# Patient Record
Sex: FEMALE | Race: BLACK OR AFRICAN AMERICAN
[De-identification: names, ages, dates, MRNs, and addresses within clinical notes are randomized per-mention and may not be internally consistent; named-entity substitution may affect disease eponyms.]

---

## 2017-08-16 ENCOUNTER — HOSPITAL ENCOUNTER (EMERGENCY)
Dept: HOSPITAL 15 - ER | Age: 27
Discharge: LEFT BEFORE DISCHARGE | End: 2017-08-16
Payer: MEDICAID

## 2017-08-16 VITALS — SYSTOLIC BLOOD PRESSURE: 107 MMHG | DIASTOLIC BLOOD PRESSURE: 61 MMHG

## 2017-08-16 VITALS — WEIGHT: 268 LBS | BODY MASS INDEX: 40.62 KG/M2 | HEIGHT: 68 IN

## 2017-08-16 DIAGNOSIS — Z53.21: ICD-10-CM

## 2017-08-16 DIAGNOSIS — R07.9: Primary | ICD-10-CM

## 2017-08-16 LAB
ALBUMIN SERPL-MCNC: 3.7 G/DL (ref 3.4–5)
ALP SERPL-CCNC: 75 U/L (ref 45–117)
ANION GAP SERPL CALCULATED.3IONS-SCNC: 8 MMOL/L (ref 5–15)
BILIRUB SERPL-MCNC: 0.3 MG/DL (ref 0.2–1)
BUN SERPL-MCNC: 7 MG/DL (ref 7–18)
BUN/CREAT SERPL: 8.5
CALCIUM SERPL-MCNC: 9.4 MG/DL (ref 8.5–10.1)
CHLORIDE SERPL-SCNC: 109 MMOL/L (ref 98–107)
CO2 SERPL-SCNC: 23 MMOL/L (ref 21–32)
GLUCOSE SERPL-MCNC: 100 MG/DL (ref 74–106)
HCT VFR BLD AUTO: 41.4 % (ref 36–46)
HGB BLD-MCNC: 13.6 G/DL (ref 12.2–16.2)
MAGNESIUM SERPL-MCNC: 2.5 MG/DL (ref 1.6–2.6)
MCH RBC QN AUTO: 28.1 PG (ref 28–32)
MCV RBC AUTO: 85.4 FL (ref 80–100)
NRBC BLD QL AUTO: 0 %
POTASSIUM SERPL-SCNC: 3.8 MMOL/L (ref 3.5–5.1)
PROT SERPL-MCNC: 7.6 G/DL (ref 6.4–8.2)
SODIUM SERPL-SCNC: 140 MMOL/L (ref 136–145)

## 2017-08-16 PROCEDURE — 83735 ASSAY OF MAGNESIUM: CPT

## 2017-08-16 PROCEDURE — 36415 COLL VENOUS BLD VENIPUNCTURE: CPT

## 2017-08-16 PROCEDURE — 85025 COMPLETE CBC W/AUTO DIFF WBC: CPT

## 2017-08-16 PROCEDURE — 80053 COMPREHEN METABOLIC PANEL: CPT

## 2017-08-16 PROCEDURE — 84484 ASSAY OF TROPONIN QUANT: CPT

## 2021-06-09 ENCOUNTER — HOSPITAL ENCOUNTER (EMERGENCY)
Dept: HOSPITAL 15 - ER | Age: 31
Discharge: HOME | End: 2021-06-09
Payer: MEDICAID

## 2021-06-09 VITALS — DIASTOLIC BLOOD PRESSURE: 53 MMHG | SYSTOLIC BLOOD PRESSURE: 120 MMHG

## 2021-06-09 VITALS — BODY MASS INDEX: 44.41 KG/M2 | WEIGHT: 293 LBS | HEIGHT: 68 IN

## 2021-06-09 DIAGNOSIS — Z79.899: ICD-10-CM

## 2021-06-09 DIAGNOSIS — I10: ICD-10-CM

## 2021-06-09 DIAGNOSIS — E78.5: ICD-10-CM

## 2021-06-09 DIAGNOSIS — S33.5XXA: Primary | ICD-10-CM

## 2021-06-09 DIAGNOSIS — Z90.49: ICD-10-CM
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
## 2021-06-09 DIAGNOSIS — R07.89: ICD-10-CM
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
## 2021-06-09 DIAGNOSIS — E66.01: ICD-10-CM

## 2021-06-09 LAB
ALBUMIN SERPL-MCNC: 3.5 G/DL (ref 3.4–5)
ALCOHOL, URINE: < 3 MG/DL (ref 0–10)
ALP SERPL-CCNC: 65 U/L (ref 45–117)
ALT SERPL-CCNC: 64 U/L (ref 13–56)
AMPHETAMINES UR QL SCN: NEGATIVE
ANION GAP SERPL CALCULATED.3IONS-SCNC: 5 MMOL/L (ref 5–15)
BARBITURATES UR QL SCN: NEGATIVE
BENZODIAZ UR QL SCN: POSITIVE
BILIRUB SERPL-MCNC: 0.3 MG/DL (ref 0.2–1)
BUN SERPL-MCNC: 8 MG/DL (ref 7–18)
BUN/CREAT SERPL: 11.6
BZE UR QL SCN: NEGATIVE
CALCIUM SERPL-MCNC: 8.8 MG/DL (ref 8.5–10.1)
CANNABINOIDS UR QL SCN: NEGATIVE
CHLORIDE SERPL-SCNC: 107 MMOL/L (ref 98–107)
CO2 SERPL-SCNC: 27 MMOL/L (ref 21–32)
GLUCOSE SERPL-MCNC: 91 MG/DL (ref 74–106)
HCT VFR BLD AUTO: 40.9 % (ref 36–46)
HGB BLD-MCNC: 13.6 G/DL (ref 12.2–16.2)
MCH RBC QN AUTO: 29 PG (ref 28–32)
MCV RBC AUTO: 87.3 FL (ref 80–100)
NRBC BLD QL AUTO: 0.1 %
OPIATES UR QL SCN: NEGATIVE
PCP UR QL SCN: NEGATIVE
POTASSIUM SERPL-SCNC: 4.2 MMOL/L (ref 3.5–5.1)
PROT SERPL-MCNC: 7.6 G/DL (ref 6.4–8.2)
SODIUM SERPL-SCNC: 139 MMOL/L (ref 136–145)

## 2021-06-09 PROCEDURE — 85025 COMPLETE CBC W/AUTO DIFF WBC: CPT

## 2021-06-09 PROCEDURE — 71045 X-RAY EXAM CHEST 1 VIEW: CPT

## 2021-06-09 PROCEDURE — 96374 THER/PROPH/DIAG INJ IV PUSH: CPT

## 2021-06-09 PROCEDURE — 81025 URINE PREGNANCY TEST: CPT

## 2021-06-09 PROCEDURE — 36415 COLL VENOUS BLD VENIPUNCTURE: CPT

## 2021-06-09 PROCEDURE — 84484 ASSAY OF TROPONIN QUANT: CPT

## 2021-06-09 PROCEDURE — 81001 URINALYSIS AUTO W/SCOPE: CPT

## 2021-06-09 PROCEDURE — 80307 DRUG TEST PRSMV CHEM ANLYZR: CPT

## 2021-06-09 PROCEDURE — 93005 ELECTROCARDIOGRAM TRACING: CPT

## 2021-06-09 PROCEDURE — 80053 COMPREHEN METABOLIC PANEL: CPT

## 2021-06-09 PROCEDURE — 99285 EMERGENCY DEPT VISIT HI MDM: CPT

## 2021-07-15 ENCOUNTER — HOSPITAL ENCOUNTER (EMERGENCY)
Dept: HOSPITAL 15 - ER | Age: 31
Discharge: HOME | End: 2021-07-15
Payer: MEDICAID

## 2021-07-15 VITALS — HEIGHT: 68 IN | WEIGHT: 293 LBS | BODY MASS INDEX: 44.41 KG/M2

## 2021-07-15 VITALS — DIASTOLIC BLOOD PRESSURE: 61 MMHG | SYSTOLIC BLOOD PRESSURE: 118 MMHG

## 2021-07-15 DIAGNOSIS — Z79.899: ICD-10-CM

## 2021-07-15 DIAGNOSIS — Z90.49: ICD-10-CM

## 2021-07-15 DIAGNOSIS — R07.89: Primary | ICD-10-CM

## 2021-07-15 DIAGNOSIS — I10: ICD-10-CM

## 2021-07-15 LAB
ALBUMIN SERPL-MCNC: 3.7 G/DL (ref 3.4–5)
ALP SERPL-CCNC: 67 U/L (ref 45–117)
ALT SERPL-CCNC: 47 U/L (ref 13–56)
ANION GAP SERPL CALCULATED.3IONS-SCNC: 4 MMOL/L (ref 5–15)
BILIRUB SERPL-MCNC: 0.3 MG/DL (ref 0.2–1)
BUN SERPL-MCNC: 7 MG/DL (ref 7–18)
BUN/CREAT SERPL: 8.8
CALCIUM SERPL-MCNC: 9.1 MG/DL (ref 8.5–10.1)
CHLORIDE SERPL-SCNC: 108 MMOL/L (ref 98–107)
CO2 SERPL-SCNC: 27 MMOL/L (ref 21–32)
CRYSTALS UR QL AUTO: (no result) /HPF
GLUCOSE SERPL-MCNC: 100 MG/DL (ref 74–106)
HCT VFR BLD AUTO: 41.5 % (ref 36–46)
HGB BLD-MCNC: 13.8 G/DL (ref 12.2–16.2)
MCH RBC QN AUTO: 28.7 PG (ref 28–32)
MCV RBC AUTO: 86.6 FL (ref 80–100)
NRBC BLD QL AUTO: 0.1 %
POTASSIUM SERPL-SCNC: 4.4 MMOL/L (ref 3.5–5.1)
PROT SERPL-MCNC: 8.1 G/DL (ref 6.4–8.2)
SODIUM SERPL-SCNC: 139 MMOL/L (ref 136–145)

## 2021-07-15 PROCEDURE — 99285 EMERGENCY DEPT VISIT HI MDM: CPT

## 2021-07-15 PROCEDURE — 81001 URINALYSIS AUTO W/SCOPE: CPT

## 2021-07-15 PROCEDURE — 96372 THER/PROPH/DIAG INJ SC/IM: CPT

## 2021-07-15 PROCEDURE — 84484 ASSAY OF TROPONIN QUANT: CPT

## 2021-07-15 PROCEDURE — 36415 COLL VENOUS BLD VENIPUNCTURE: CPT

## 2021-07-15 PROCEDURE — 85025 COMPLETE CBC W/AUTO DIFF WBC: CPT

## 2021-07-15 PROCEDURE — 80053 COMPREHEN METABOLIC PANEL: CPT

## 2021-08-14 ENCOUNTER — HOSPITAL ENCOUNTER (EMERGENCY)
Dept: HOSPITAL 15 - ER | Age: 31
Discharge: HOME | End: 2021-08-14
Payer: MEDICAID

## 2021-08-14 VITALS — WEIGHT: 290 LBS | BODY MASS INDEX: 43.95 KG/M2 | HEIGHT: 68 IN

## 2021-08-14 VITALS — SYSTOLIC BLOOD PRESSURE: 127 MMHG | DIASTOLIC BLOOD PRESSURE: 82 MMHG

## 2021-08-14 DIAGNOSIS — Z79.899: ICD-10-CM

## 2021-08-14 DIAGNOSIS — Z90.49: ICD-10-CM

## 2021-08-14 DIAGNOSIS — K29.70: Primary | ICD-10-CM

## 2021-08-14 DIAGNOSIS — I10: ICD-10-CM

## 2021-08-14 LAB
ALBUMIN SERPL-MCNC: 3.3 G/DL (ref 3.4–5)
ALP SERPL-CCNC: 69 U/L (ref 45–117)
ALT SERPL-CCNC: 62 U/L (ref 13–56)
ANION GAP SERPL CALCULATED.3IONS-SCNC: 4 MMOL/L (ref 5–15)
BILIRUB SERPL-MCNC: 0.3 MG/DL (ref 0.2–1)
BUN SERPL-MCNC: 11 MG/DL (ref 7–18)
BUN/CREAT SERPL: 14.1
CALCIUM SERPL-MCNC: 8.8 MG/DL (ref 8.5–10.1)
CHLORIDE SERPL-SCNC: 108 MMOL/L (ref 98–107)
CO2 SERPL-SCNC: 26 MMOL/L (ref 21–32)
GLUCOSE SERPL-MCNC: 98 MG/DL (ref 74–106)
HCT VFR BLD AUTO: 39.4 % (ref 36–46)
HGB BLD-MCNC: 13.2 G/DL (ref 12.2–16.2)
MAGNESIUM SERPL-MCNC: 2.5 MG/DL (ref 1.6–2.6)
MCH RBC QN AUTO: 28.9 PG (ref 28–32)
MCV RBC AUTO: 86.4 FL (ref 80–100)
NRBC BLD QL AUTO: 0 %
POTASSIUM SERPL-SCNC: 3.8 MMOL/L (ref 3.5–5.1)
PROT SERPL-MCNC: 7.4 G/DL (ref 6.4–8.2)
SODIUM SERPL-SCNC: 138 MMOL/L (ref 136–145)

## 2021-08-14 PROCEDURE — 84484 ASSAY OF TROPONIN QUANT: CPT

## 2021-08-14 PROCEDURE — 76705 ECHO EXAM OF ABDOMEN: CPT

## 2021-08-14 PROCEDURE — 74176 CT ABD & PELVIS W/O CONTRAST: CPT

## 2021-08-14 PROCEDURE — 80053 COMPREHEN METABOLIC PANEL: CPT

## 2021-08-14 PROCEDURE — 83735 ASSAY OF MAGNESIUM: CPT

## 2021-08-14 PROCEDURE — 93005 ELECTROCARDIOGRAM TRACING: CPT

## 2021-08-14 PROCEDURE — 85025 COMPLETE CBC W/AUTO DIFF WBC: CPT

## 2021-08-14 PROCEDURE — 36415 COLL VENOUS BLD VENIPUNCTURE: CPT

## 2021-08-14 PROCEDURE — 81001 URINALYSIS AUTO W/SCOPE: CPT

## 2021-08-14 PROCEDURE — 81025 URINE PREGNANCY TEST: CPT

## 2021-08-14 PROCEDURE — 83690 ASSAY OF LIPASE: CPT

## 2021-09-14 ENCOUNTER — HOSPITAL ENCOUNTER (EMERGENCY)
Dept: HOSPITAL 15 - ER | Age: 31
Discharge: HOME | End: 2021-09-14
Payer: MEDICAID

## 2021-09-14 VITALS — WEIGHT: 290 LBS | HEIGHT: 68 IN | BODY MASS INDEX: 43.95 KG/M2

## 2021-09-14 VITALS — DIASTOLIC BLOOD PRESSURE: 67 MMHG | SYSTOLIC BLOOD PRESSURE: 123 MMHG

## 2021-09-14 DIAGNOSIS — R07.89: ICD-10-CM

## 2021-09-14 DIAGNOSIS — N39.0: ICD-10-CM

## 2021-09-14 DIAGNOSIS — Z79.899: ICD-10-CM

## 2021-09-14 DIAGNOSIS — I10: Primary | ICD-10-CM

## 2021-09-14 DIAGNOSIS — Z90.49: ICD-10-CM

## 2021-09-14 LAB
ALBUMIN SERPL-MCNC: 3.4 G/DL (ref 3.4–5)
ALP SERPL-CCNC: 77 U/L (ref 45–117)
ALT SERPL-CCNC: 57 U/L (ref 13–56)
ANION GAP SERPL CALCULATED.3IONS-SCNC: 8 MMOL/L (ref 5–15)
BILIRUB SERPL-MCNC: 0.4 MG/DL (ref 0.2–1)
BUN SERPL-MCNC: 8 MG/DL (ref 7–18)
BUN/CREAT SERPL: 8.9
CALCIUM SERPL-MCNC: 8.6 MG/DL (ref 8.5–10.1)
CHLORIDE SERPL-SCNC: 108 MMOL/L (ref 98–107)
CO2 SERPL-SCNC: 23 MMOL/L (ref 21–32)
GLUCOSE SERPL-MCNC: 120 MG/DL (ref 74–106)
HCT VFR BLD AUTO: 43.8 % (ref 36–46)
HGB BLD-MCNC: 14.4 G/DL (ref 12.2–16.2)
MCH RBC QN AUTO: 28.7 PG (ref 28–32)
MCV RBC AUTO: 87 FL (ref 80–100)
NRBC BLD QL AUTO: 0.1 %
POTASSIUM SERPL-SCNC: 3.7 MMOL/L (ref 3.5–5.1)
PROT SERPL-MCNC: 7.6 G/DL (ref 6.4–8.2)
SODIUM SERPL-SCNC: 139 MMOL/L (ref 136–145)

## 2021-09-14 PROCEDURE — 84484 ASSAY OF TROPONIN QUANT: CPT

## 2021-09-14 PROCEDURE — 36415 COLL VENOUS BLD VENIPUNCTURE: CPT

## 2021-09-14 PROCEDURE — 99285 EMERGENCY DEPT VISIT HI MDM: CPT

## 2021-09-14 PROCEDURE — 81001 URINALYSIS AUTO W/SCOPE: CPT

## 2021-09-14 PROCEDURE — 96372 THER/PROPH/DIAG INJ SC/IM: CPT

## 2021-09-14 PROCEDURE — 85025 COMPLETE CBC W/AUTO DIFF WBC: CPT

## 2021-09-14 PROCEDURE — 80053 COMPREHEN METABOLIC PANEL: CPT

## 2021-09-14 PROCEDURE — 83880 ASSAY OF NATRIURETIC PEPTIDE: CPT

## 2021-09-14 PROCEDURE — 93005 ELECTROCARDIOGRAM TRACING: CPT

## 2021-09-14 PROCEDURE — 81025 URINE PREGNANCY TEST: CPT

## 2021-09-14 PROCEDURE — 71045 X-RAY EXAM CHEST 1 VIEW: CPT

## 2021-09-14 PROCEDURE — 84443 ASSAY THYROID STIM HORMONE: CPT

## 2021-10-30 ENCOUNTER — HOSPITAL ENCOUNTER (EMERGENCY)
Dept: HOSPITAL 15 - ER | Age: 31
Discharge: HOME | End: 2021-10-30
Payer: MEDICAID

## 2021-10-30 VITALS — DIASTOLIC BLOOD PRESSURE: 74 MMHG | SYSTOLIC BLOOD PRESSURE: 128 MMHG

## 2021-10-30 VITALS — BODY MASS INDEX: 44.41 KG/M2 | HEIGHT: 68 IN | WEIGHT: 293 LBS
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
## 2021-10-30 DIAGNOSIS — F12.10: ICD-10-CM

## 2021-10-30 DIAGNOSIS — Z90.49: ICD-10-CM
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
## 2021-10-30 DIAGNOSIS — I10: ICD-10-CM

## 2021-10-30 DIAGNOSIS — S29.012A: Primary | ICD-10-CM

## 2021-10-30 PROCEDURE — 81002 URINALYSIS NONAUTO W/O SCOPE: CPT

## 2021-10-30 PROCEDURE — 96372 THER/PROPH/DIAG INJ SC/IM: CPT

## 2021-10-30 PROCEDURE — 71046 X-RAY EXAM CHEST 2 VIEWS: CPT

## 2021-10-30 PROCEDURE — 99283 EMERGENCY DEPT VISIT LOW MDM: CPT

## 2021-10-30 PROCEDURE — 81025 URINE PREGNANCY TEST: CPT

## 2021-11-13 ENCOUNTER — HOSPITAL ENCOUNTER (EMERGENCY)
Dept: HOSPITAL 15 - ER | Age: 31
Discharge: LEFT BEFORE DISCHARGE | End: 2021-11-13
Payer: MEDICAID

## 2021-11-13 VITALS — HEIGHT: 68 IN | BODY MASS INDEX: 44.41 KG/M2 | WEIGHT: 293 LBS

## 2021-11-13 VITALS — DIASTOLIC BLOOD PRESSURE: 60 MMHG | SYSTOLIC BLOOD PRESSURE: 100 MMHG

## 2021-11-13 DIAGNOSIS — Z53.21: ICD-10-CM

## 2021-11-13 DIAGNOSIS — R07.9: Primary | ICD-10-CM

## 2021-11-13 LAB
ALBUMIN SERPL-MCNC: 3.4 G/DL (ref 3.4–5)
ALP SERPL-CCNC: 69 U/L (ref 45–117)
ALT SERPL-CCNC: 44 U/L (ref 13–56)
ANION GAP SERPL CALCULATED.3IONS-SCNC: 5 MMOL/L (ref 5–15)
BILIRUB SERPL-MCNC: 0.3 MG/DL (ref 0.2–1)
BUN SERPL-MCNC: 10 MG/DL (ref 7–18)
BUN/CREAT SERPL: 10.6
CALCIUM SERPL-MCNC: 8.5 MG/DL (ref 8.5–10.1)
CHLORIDE SERPL-SCNC: 107 MMOL/L (ref 98–107)
CO2 SERPL-SCNC: 25 MMOL/L (ref 21–32)
GLUCOSE SERPL-MCNC: 88 MG/DL (ref 74–106)
HCT VFR BLD AUTO: 42 % (ref 36–46)
HGB BLD-MCNC: 13.7 G/DL (ref 12.2–16.2)
MCH RBC QN AUTO: 28.3 PG (ref 28–32)
MCV RBC AUTO: 86.5 FL (ref 80–100)
NRBC BLD QL AUTO: 0 %
POTASSIUM SERPL-SCNC: 3.8 MMOL/L (ref 3.5–5.1)
PROT SERPL-MCNC: 7.4 G/DL (ref 6.4–8.2)
SODIUM SERPL-SCNC: 137 MMOL/L (ref 136–145)

## 2021-11-13 PROCEDURE — 85025 COMPLETE CBC W/AUTO DIFF WBC: CPT

## 2021-11-13 PROCEDURE — 93005 ELECTROCARDIOGRAM TRACING: CPT

## 2021-11-13 PROCEDURE — 80053 COMPREHEN METABOLIC PANEL: CPT

## 2021-11-13 PROCEDURE — 84484 ASSAY OF TROPONIN QUANT: CPT

## 2021-11-13 PROCEDURE — 36415 COLL VENOUS BLD VENIPUNCTURE: CPT

## 2025-01-07 ENCOUNTER — HOSPITAL ENCOUNTER (OUTPATIENT)
Dept: HOSPITAL 15 - RT | Age: 35
Discharge: HOME | End: 2025-01-07
Attending: INTERNAL MEDICINE
Payer: COMMERCIAL

## 2025-01-07 DIAGNOSIS — R06.09: Primary | ICD-10-CM

## 2025-01-07 PROCEDURE — 94060 EVALUATION OF WHEEZING: CPT

## 2025-01-07 PROCEDURE — 94727 GAS DIL/WSHOT DETER LNG VOL: CPT

## 2025-01-07 PROCEDURE — 94729 DIFFUSING CAPACITY: CPT

## 2025-01-13 NOTE — DVHNC2
Procedure


-


Pulmonary function test interpretation 


January 7, 2025 


No obstructive or restrictive ventilatory defect.  


No significant bronchodilator response.  


Total lung capacity is within normal limits, 96% of predicted (5.68 L).  


Diffusion capacity is within normal limits, 107% of predicted.











ANGELA ORTEGA MD             Jan 13, 2025 21:54

## 2025-03-22 ENCOUNTER — HOSPITAL ENCOUNTER (EMERGENCY)
Dept: HOSPITAL 15 - ER | Age: 35
Discharge: HOME | End: 2025-03-22
Payer: COMMERCIAL

## 2025-03-22 VITALS
DIASTOLIC BLOOD PRESSURE: 81 MMHG | TEMPERATURE: 98.3 F | SYSTOLIC BLOOD PRESSURE: 156 MMHG | HEART RATE: 117 BPM | OXYGEN SATURATION: 97 % | RESPIRATION RATE: 18 BRPM

## 2025-03-22 VITALS — BODY MASS INDEX: 44.41 KG/M2 | HEIGHT: 68 IN | WEIGHT: 293 LBS

## 2025-03-22 DIAGNOSIS — A59.01: ICD-10-CM

## 2025-03-22 DIAGNOSIS — O23.42: ICD-10-CM

## 2025-03-22 DIAGNOSIS — N39.0: ICD-10-CM

## 2025-03-22 DIAGNOSIS — Z3A.18: ICD-10-CM

## 2025-03-22 DIAGNOSIS — O23.592: Primary | ICD-10-CM

## 2025-03-22 LAB
CRYSTALS UR QL AUTO: (no result) /HPF
PROT UR STRIP.AUTO-MCNC: (no result) MG/DL

## 2025-03-22 PROCEDURE — 87210 SMEAR WET MOUNT SALINE/INK: CPT

## 2025-03-22 PROCEDURE — 99283 EMERGENCY DEPT VISIT LOW MDM: CPT

## 2025-03-22 PROCEDURE — 96372 THER/PROPH/DIAG INJ SC/IM: CPT

## 2025-03-22 PROCEDURE — 81001 URINALYSIS AUTO W/SCOPE: CPT

## 2025-03-22 PROCEDURE — 81025 URINE PREGNANCY TEST: CPT

## 2025-03-22 RX ADMIN — ONDANSETRON ONE MG: 4 TABLET, ORALLY DISINTEGRATING ORAL at 12:47

## 2025-03-22 RX ADMIN — DEXTROSE ONE MG: 50 INJECTION, SOLUTION INTRAVENOUS at 12:42

## 2025-03-22 NOTE — ED.PDOC
OB/GYN


HPI Comments


A 34 YEAR OLD FEMALE PRESENTS TO THE ED WITH COMPLAINT OF VAGINAL DISCHARGE.  

PATIENT STATES SHE HAS BEEN EXPERIENCING VAGINAL DISCHARGE THAT IS WHITE IN 

COLOR WITH PAINFUL URINATION FOR THE PAST 1 WEEK.  PATIENT NOTES SHE IS 

CURRENTLY 18 WEEKS PREGNANT. PT DENIES PREGNANCY PROBLEMS. PATIENT DENIES 

HEMATURIA, FLANK PAIN, VAGINAL SPOTTING/BLEEDING, FEVER, CHILLS, SHORTNESS OF 

BREATH, CHEST PAIN, ABDOMINAL PAIN, NAUSEA, VOMITING, HEADACHE, OR OTHER 

COMPLAINTS. NO OTHER SYMPTOMS OR MODIFYING FACTORS AT THIS TIME. PATIENT IS 

ALERT, ORIENTED X 4, AND HAS STEADY GAIT.


Chief Complaint:  Urinary


Time Seen by MD:  11:01


Reviewed Notes:  Nurses Notes, Medications, Allergies


Allergies:  


Coded Allergies:  


     NO KNOWN ALLERGIES (Unverified , 3/22/25)


Home Meds


Active Scripts


Metronidazole (Flagyl) 500 Mg Tab, 500 MG PO TID, #21 TAB


   Prov:DAVID SHEPPARD         3/22/25


Nitrofurantoin Monohydrate Mac (Macrobid) 100 Mg Cap, 100 MG PO BID, #20 CAP


   Prov:DAVID SHEPPARD         3/22/25


Information Source:  Patient


Mode of Arrival:  Ambulatory


Timing:  Days


Prehospital treatment:  None


Severity:  Moderate


Vaginal Discharge:  White


Vaginal Lesions:  None


Vaginal Mass:  None


Onset Of Mass/Bleeding:  Spontaneous


Sexual Activity:  Pregnant


Last Consensual Remer:  Unknown


Birth Control:  None


History of:  Current Pregnancy


Blood Type:  Unknown


Symptoms of Possible Pregnancy:  None


Associated Signs and Symptoms:  Vaginal Discharge





Past Medical History


PAST MEDICAL HISTORY:  Anxiety


Surgical History:  Denies all surgeries


GYN History:  No Pertinent GYN History





Family History


Family History:  Reviewed,noncontributory to illness





Social History


Smoker:  Non-Smoker


Alcohol:  Denies ETOH Use


Drugs:  Denies Drug Use


Lives In:  Home





Constitutional:  denies: chills, diaphoresis, fatigue, fever, malaise, sweats, 

weakness, others


EENTM:  denies: blurred vision, double vision, ear bleeding, ear discharge, ear 

drainage, ear pain, ear ringing, eye pain, eye redness, hearing loss, mouth 

pain, mouth swelling, nasal discharge, nose bleeding, nose congestion, nose 

pain, photophobia, tearing, throat pain, throat swelling, voice changes, others


Respiratory:  denies: cough, hemoptysis, orthopnea, SOB at rest, shortness of 

breath, SOB with excertion, stridor, wheezing, others


Cardiovascular:  denies: chest pain, dizzy spells, diaphoresis, Dyspnea on 

exertion, edema, irregular heart beat, left arm pain, lightheadedness, 

palpitations, PND, syncope, others


Gastrointestinal:  denies: abdomen distended, abdominal pain, blood streaked 

bowels, constipated, diarrhea, dysphagia, difficulty swallowing, hematemesis, 

melena, nausea, poor appetite, poor fluid intake, rectal bleeding, rectal pain, 

vomiting, others


Genitourinary:  reports: burning, dysuria, vagina discharge; denies: abnormal 

vagina bleeding, dyspareunia, flank pain, frequency, hematuria, incontinence, 

pain, pregnant, urgency, others


Neurological:  denies: dizziness, fainting, headache, left sided numbness, left 

sided weakness, numbness, paresthesia, pre-existing deficit, right sided 

numbness, right sided weakness, seizure, speech problems, tingling, tremors, 

weakness, others


Musculoskeletal:  denies: back pain, gout, joint pain, joint swelling, muscle 

pain, muscle stiffness, neck pain, others


Integumetry:  denies: bruises, change in color, change in hair/nails, dryness, 

laceration, lesions, lumps, rash, wounds, others


Allergic/Immunocompromised:  denies: Difficulty Healing, Frequent Infections, 

Hives, Itching, others


Hematologic/Lymphatic:  denies: anemia, blood clots, easy bleeding, easy 

bruising, swollen glands, others


Endocrine:  denies: excessive hunger, excessive sweating, excessive thirst, 

excessive urination, flushing, intolerance to cold, intolerance to heat, 

unexplained weight gain, unexplained weight loss, others


Psychiatric:  denies: anxiety, bipolar disorder, depression, hopeless, panic 

disorder, schizophrenia, sleepless, suicidal, others


All Other Systems:  Reviewed and Negative





Physical Exam


General Appearance:  No Apparent Distress, Obese


HEENT:  Normal ENT Inspection, PERRL/EOMI, Pharynx Normal, TMs Normal


Neck:  Full Range of Motion, Non-Tender, Normal, Normal Inspection


Respiratory:  Chest Non-Tender, Lungs Clear, No Accessory Muscle Use, No 

Respiratory Distress, Normal Breath Sounds


Cardiovascular:  No Edema, No JVD, No Murmur, No Gallop, Normal Peripheral 

Pulses, Regular Rate/Rhythm


Breast Exam:  Deferred


Gastrointestinal:  No Organomegaly, Non Tender, No Pulsatile Mass, Normal Bowel 

Sounds, Soft


Genitalia:  Deferred


Pelvic:  Discharge (YELLOW AND WHITE VAGINAL DISCHARGE WITH ODOR. ), Normal 

External Exam


Rectal:  Deferred


Extremities:  No calf tenderness, Normal capillary refill, Normal inspection, 

Normal range of motion, Non-tender, No pedal edema


Musculoskeletal :  


   Apperance:  Normal


Neurologic:  Alert, CNs II-XII nml as Tested, No Motor Deficits, Normal Affect, 

Normal Mood, No Sensory Deficits


Cerebellar Function:  Normal


Reflexes:  Normal


Skin:  Dry, Normal Color, Warm


Peripheral Pulses:  2+ carotid (R), 2+ carotid (L)


Lymphatic:  No Adenopathy





Was a procedure done?


Was a procedure done?:  No





Differential Diagnosis (GYN)


Vaginal Bleeding:  N/A


Mass / Lesion:  N/A


Vaginal Discharge:  Pregnancy, UTI, Vaginitis - Bacterial, Vaginitis - Candidal,

Vaginitis - Trichomonas





X-Ray, Labs, Meds, VS





                                   Vital Signs








  Date Time  Temp Pulse Resp B/P (MAP) Pulse Ox O2 Delivery O2 Flow Rate FiO2


 


3/22/25 11:31 98.3 117 18 156/81 (106) 97   





 98.3       


 


3/22/25 11:31  117 18  97 Room Air  


 


3/22/25 11:06 98.3 117 18 156/81 (106) 97   





 98.3       








                                       Lab








Test


 3/22/25


12:01 3/22/25


11:54 Range/Units


 


 


Vaginal WBC (Wet Prep) Moderate    


 


Vaginal RBC (Wet Prep) Few    


 


Vaginal Epithelial Cells (Wet


Prep) Few 


 


  





 


Vaginal Bacteria (Wet Prep) Moderate    


 


Vaginal Trichomonas (Wet Prep) Present    


 


Vaginal Yeast (Wet Prep) None seen    


 


Vaginal Clue Cells (Wet Prep) None seen    


 


Urine Color  Yellow  Yellow  


 


Urine Clarity  Cloudy H Clear  


 


Urine pH  7.0  5.0-9.0  


 


Urine Specific Gravity  1.012  1.001-1.035  


 


Urine Protein  Trace H Negative  


 


Urine Ketones  1+ H Negative  


 


Urine Blood  Negative  Negative  /uL


 


Urine Nitrite  Negative  Negative  


 


Urine Bilirubin  Negative  Negative  


 


Urine Urobilinogen  Normal  Negative  mg/dL


 


Urine Leukocyte Esterase  3+  Negative  /uL


 


Urine RBC  37  0 - 4  /hpf


 


Urine Microscopic WBC  36 H 0-5  /HPF


 


Urine Squamous Epithelial


Cells 


 Mod 


 <5  /hpf





 


Urine Amorphous Crystals  Few  None Seen  /hpf


 


Urine Bacteria  Few H None Seen  /hpf


 


Urine Glucose  Normal  Normal  mg/dL


 


Urine Pregnancy Test  Positive  Negative  








                               Current Medications








 Medications


  (Trade)  Dose


 Ordered  Sig/Jeana


 Route  Start Time


 Stop Time Status Last Admin


 


 Ceftriaxone Sodium


  (Rocephin)  1,000 mg  ONCE  ONCE


 IM  3/22/25 12:45


 3/22/25 12:46 DC 3/22/25 12:42





 


 Ondansetron HCl


  (Zofran Po)  4 mg  ONCE  ONCE


 PO  3/22/25 12:45


 3/22/25 12:46 DC 3/22/25 12:47











X-Ray, Labs, Meds, VS Comment


EXTERNAL MEDICAL RECORDS REVIEWED: [NONE] 


INDEPENDENT HISTORIANS: [NONE]


SOCIAL DETERMINANTS OF HEALTH: [NONE]





LABS ORDERED:  UA, WET MOUNT


REVIEWED AND INTERPRETED RESULTS:  LEUKOCYTES 3+, WM TRICHOMONAS PRESENT, WM WBC

 MODERATE





IMAGING ORDERED: NONE





TREATMENTS ORDERED:  ROCEPHIN 1 G IM, ZOFRAN 4 MG P.O.





PROCEDURES PERFORMED: NONE





CRITICAL CARE TIME: NONE





I HAVE DISCUSSED THE PATIENT WITH THE ATTENDING PHYSICIAN DR. BURGOS AND HE AGREES 

WITH THE PATIENT'S PLAN OF CARE AND DISPOSITION.





BASED ON HISTORY OF PRESENT ILLNESS, AND PHYSICAL EXAM, PATIENT WILL BE 

DISCHARGED HOME. DISCUSSED PLAN FOR DISCHARGE HOME WITH RX [FLAGYL 500 MG AND 

MACROBID]. MEDICATION WARNINGS GIVEN. 





SHARED DECISION MAKING: PATIENT INSTRUCTED TO FOLLOW UP WITH PRIMARY CARE 

PROVIDER IN 1-2 DAYS FOR RE-EVALUATION OF SYMPTOMS. PATIENT VERBALIZES 

UNDERSTANDING TO RETURN TO ED FOR NEW OR WORSENING SYMPTOMS OR IF FOLLOW UP WITH

PCP CANNOT BE OBTAINED. PATIENT FEELS COMFORTABLE GOING HOME AT THIS TIME. ALL 

QUESTIONS ADDRESSED AT TIME OF DISCHARGE.


Time of 1ST Reevaluation:  13:10


Reevaluation 1ST:  Improved


Patient Education/Counseling:  Diagnosis, Treatment, Need For Follow Up


Family Education/Counseling:  Diagnosis, Treatment, Need For Follow Up


Medical Screening:  No EMC Exist At This Time





Departure 1


Departure


Time of Disposition:  13:10


Impression:  


   Primary Impression:  


   Trichomonas vaginitis


   Additional Impression:  


   Acute UTI (urinary tract infection)


Disposition:  01 HOME / SELF CARE / HOMELESS


Condition:  Stable





Additional Instructions:  


FOLLOW-UP WITH PCP AND OB/GYN IN 1 TO 2 DAYS.  TAKE MEDICATIONS AS PRESCRIBED.  

RETURN TO ED FOR ANY NEW OR WORSENING SYMPTOMS.


e-Prescriptions


Metronidazole (Flagyl) 500 Mg Tab


500 MG PO TID, #21 TAB


   Prov: DAVID SHEPPARD         3/22/25 


Nitrofurantoin Monohydrate Mac (Macrobid) 100 Mg Cap


100 MG PO BID, #20 CAP


   Prov: DAVID SHEPPARD         3/22/25


Discharged With:  Self





Critical Care Note


Critical Care Time?:  No





Stability


Stability form required:  No








I personally scribed for DAVID SHEPPARD ([email or web address]) on 3/22/25 at 11:51.  

Electronically submitted by Haja Bowens (MARIA ISABEL).


I personally scribed for DAVID SHEPPARD ([email or web address]) on 3/22/25 at 12:59.  

Electronically submitted by Haja oBwens (MARIA ISABEL).





DAVID SHEPPARD                 Mar 22, 2025 11:51

## 2025-05-04 ENCOUNTER — HOSPITAL ENCOUNTER (EMERGENCY)
Dept: HOSPITAL 15 - ER | Age: 35
Discharge: HOME | End: 2025-05-04
Payer: COMMERCIAL

## 2025-05-04 VITALS
TEMPERATURE: 98 F | DIASTOLIC BLOOD PRESSURE: 79 MMHG | RESPIRATION RATE: 16 BRPM | HEART RATE: 100 BPM | SYSTOLIC BLOOD PRESSURE: 125 MMHG | OXYGEN SATURATION: 94 %

## 2025-05-04 DIAGNOSIS — N76.0: Primary | ICD-10-CM

## 2025-05-04 DIAGNOSIS — F41.9: ICD-10-CM

## 2025-05-04 DIAGNOSIS — O99.321: ICD-10-CM

## 2025-05-04 DIAGNOSIS — Z90.49: ICD-10-CM

## 2025-05-04 DIAGNOSIS — Z79.899: ICD-10-CM

## 2025-05-04 DIAGNOSIS — Z98.890: ICD-10-CM

## 2025-05-04 DIAGNOSIS — B96.89: ICD-10-CM

## 2025-05-04 DIAGNOSIS — I10: ICD-10-CM

## 2025-05-04 PROCEDURE — 87210 SMEAR WET MOUNT SALINE/INK: CPT

## 2025-05-04 PROCEDURE — 81001 URINALYSIS AUTO W/SCOPE: CPT

## 2025-06-19 ENCOUNTER — HOSPITAL ENCOUNTER (EMERGENCY)
Dept: HOSPITAL 15 - ER | Age: 35
Discharge: HOME | End: 2025-06-19
Payer: COMMERCIAL

## 2025-06-19 VITALS
DIASTOLIC BLOOD PRESSURE: 60 MMHG | HEART RATE: 118 BPM | OXYGEN SATURATION: 96 % | RESPIRATION RATE: 13 BRPM | TEMPERATURE: 98.2 F | SYSTOLIC BLOOD PRESSURE: 126 MMHG

## 2025-06-19 VITALS — HEIGHT: 68 IN | BODY MASS INDEX: 44.41 KG/M2 | WEIGHT: 293 LBS

## 2025-06-19 DIAGNOSIS — O26.893: Primary | ICD-10-CM

## 2025-06-19 DIAGNOSIS — Z3A.31: ICD-10-CM

## 2025-06-19 DIAGNOSIS — O10.913: ICD-10-CM

## 2025-06-19 DIAGNOSIS — F41.9: ICD-10-CM

## 2025-06-19 DIAGNOSIS — Z79.899: ICD-10-CM

## 2025-06-19 DIAGNOSIS — R10.2: ICD-10-CM

## 2025-06-19 DIAGNOSIS — Z90.49: ICD-10-CM

## 2025-06-19 LAB
BACTERIA UR QL AUTO: (no result) /HPF
BACTERIA VAG QL WET PREP: (no result)
CASTS URNS QL MICRO: (no result) /LPF
CELLULAR CAST: (no result) /HPF
CLARITY UR: CLEAR
CLUE CELLS VAG QL WET PREP: (no result)
COARSE GRAN CASTS URNS QL MICRO: (no result) /LPF
COLOR UR: (no result)
CRYSTALS UR QL AUTO: (no result) /HPF
CRYSTALS URNS MICRO: (no result) /HPF
FINE GRAN CASTS #/AREA URNS AUTO: (no result) /LPF
HGB UR QL STRIP: NEGATIVE /UL
HYALINE CASTS UR QL AUTO: (no result) /LPF (ref 0–2)
LEUCINE CRYSTALS [PRESENCE] IN URINE BY COMPUTER ASSISTED METHOD: (no result) /HPF
Lab: (no result)
Lab: (no result)
MUCOUS THREADS UR QL AUTO: (no result)
MUCOUS THREADS UR QL AUTO: (no result) /HPF (ref ?–5)
PH UR: 6.5 [PH] (ref 5–9)
PROT UR STRIP.AUTO-MCNC: NEGATIVE MG/DL
SP GR UR STRIP: 1.01 (ref 1–1.03)
SPERM UR QL AUTO: (no result) /HPF
STARCH GRANULES URNS QL MICRO: (no result)
URINE SQUAMOUS EPITHELIAL CELL: (no result) /HPF (ref ?–5)
UROBILINOGEN UR QL: NORMAL MG/DL
WBC #/AREA URNS AUTO: 1 /HPF (ref 0–5)
WBC CLUMPS UR QL AUTO: (no result) /HPF
YEAST # UR AUTO: (no result) /HPF
YEAST HYPHAE UR QL COMP ASSIST: (no result) /HPF

## 2025-06-19 PROCEDURE — 81001 URINALYSIS AUTO W/SCOPE: CPT

## 2025-06-19 PROCEDURE — 87210 SMEAR WET MOUNT SALINE/INK: CPT
